# Patient Record
Sex: MALE | Race: OTHER | Employment: OTHER | URBAN - METROPOLITAN AREA
[De-identification: names, ages, dates, MRNs, and addresses within clinical notes are randomized per-mention and may not be internally consistent; named-entity substitution may affect disease eponyms.]

---

## 2019-08-13 ENCOUNTER — APPOINTMENT (OUTPATIENT)
Dept: CT IMAGING | Age: 44
End: 2019-08-13
Attending: EMERGENCY MEDICINE
Payer: SUBSIDIZED

## 2019-08-13 ENCOUNTER — HOSPITAL ENCOUNTER (EMERGENCY)
Age: 44
Discharge: HOME | End: 2019-08-13
Attending: EMERGENCY MEDICINE
Payer: SUBSIDIZED

## 2019-08-13 VITALS
DIASTOLIC BLOOD PRESSURE: 81 MMHG | SYSTOLIC BLOOD PRESSURE: 135 MMHG | HEART RATE: 60 BPM | HEIGHT: 66 IN | RESPIRATION RATE: 18 BRPM | TEMPERATURE: 98.4 F | OXYGEN SATURATION: 98 % | WEIGHT: 198 LBS | BODY MASS INDEX: 31.82 KG/M2

## 2019-08-13 DIAGNOSIS — G51.0 BELL'S PALSY: Primary | ICD-10-CM

## 2019-08-13 LAB
ALBUMIN SERPL-MCNC: 4 G/DL (ref 3.5–5)
ALBUMIN/GLOB SERPL: 1.1 {RATIO} (ref 1.1–2.2)
ALP SERPL-CCNC: 82 U/L (ref 45–117)
ALT SERPL-CCNC: 32 U/L (ref 12–78)
ANION GAP SERPL CALC-SCNC: 6 MMOL/L (ref 5–15)
AST SERPL-CCNC: 10 U/L (ref 15–37)
BASOPHILS # BLD: 0 K/UL (ref 0–0.1)
BASOPHILS NFR BLD: 0 % (ref 0–1)
BILIRUB SERPL-MCNC: 0.8 MG/DL (ref 0.2–1)
BUN SERPL-MCNC: 16 MG/DL (ref 6–20)
BUN/CREAT SERPL: 17 (ref 12–20)
CALCIUM SERPL-MCNC: 8.5 MG/DL (ref 8.5–10.1)
CHLORIDE SERPL-SCNC: 107 MMOL/L (ref 97–108)
CO2 SERPL-SCNC: 26 MMOL/L (ref 21–32)
CREAT SERPL-MCNC: 0.94 MG/DL (ref 0.7–1.3)
DIFFERENTIAL METHOD BLD: NORMAL
EOSINOPHIL # BLD: 0.1 K/UL (ref 0–0.4)
EOSINOPHIL NFR BLD: 1 % (ref 0–7)
ERYTHROCYTE [DISTWIDTH] IN BLOOD BY AUTOMATED COUNT: 13.7 % (ref 11.5–14.5)
GLOBULIN SER CALC-MCNC: 3.6 G/DL (ref 2–4)
GLUCOSE SERPL-MCNC: 115 MG/DL (ref 65–100)
HCT VFR BLD AUTO: 44.3 % (ref 36.6–50.3)
HGB BLD-MCNC: 15.6 G/DL (ref 12.1–17)
IMM GRANULOCYTES # BLD AUTO: 0 K/UL (ref 0–0.04)
IMM GRANULOCYTES NFR BLD AUTO: 0 % (ref 0–0.5)
LYMPHOCYTES # BLD: 3 K/UL (ref 0.8–3.5)
LYMPHOCYTES NFR BLD: 44 % (ref 12–49)
MCH RBC QN AUTO: 30.1 PG (ref 26–34)
MCHC RBC AUTO-ENTMCNC: 35.2 G/DL (ref 30–36.5)
MCV RBC AUTO: 85.5 FL (ref 80–99)
MONOCYTES # BLD: 0.6 K/UL (ref 0–1)
MONOCYTES NFR BLD: 8 % (ref 5–13)
NEUTS SEG # BLD: 3.1 K/UL (ref 1.8–8)
NEUTS SEG NFR BLD: 47 % (ref 32–75)
NRBC # BLD: 0 K/UL (ref 0–0.01)
NRBC BLD-RTO: 0 PER 100 WBC
PLATELET # BLD AUTO: 279 K/UL (ref 150–400)
PMV BLD AUTO: 10.2 FL (ref 8.9–12.9)
POTASSIUM SERPL-SCNC: 3.9 MMOL/L (ref 3.5–5.1)
PROT SERPL-MCNC: 7.6 G/DL (ref 6.4–8.2)
RBC # BLD AUTO: 5.18 M/UL (ref 4.1–5.7)
SODIUM SERPL-SCNC: 139 MMOL/L (ref 136–145)
WBC # BLD AUTO: 6.8 K/UL (ref 4.1–11.1)

## 2019-08-13 PROCEDURE — 80053 COMPREHEN METABOLIC PANEL: CPT

## 2019-08-13 PROCEDURE — 74011250637 HC RX REV CODE- 250/637: Performed by: EMERGENCY MEDICINE

## 2019-08-13 PROCEDURE — 85025 COMPLETE CBC W/AUTO DIFF WBC: CPT

## 2019-08-13 PROCEDURE — 70450 CT HEAD/BRAIN W/O DYE: CPT

## 2019-08-13 PROCEDURE — 99283 EMERGENCY DEPT VISIT LOW MDM: CPT

## 2019-08-13 PROCEDURE — 36415 COLL VENOUS BLD VENIPUNCTURE: CPT

## 2019-08-13 RX ORDER — ACETAMINOPHEN 500 MG
1000 TABLET ORAL ONCE
Status: COMPLETED | OUTPATIENT
Start: 2019-08-13 | End: 2019-08-13

## 2019-08-13 RX ADMIN — ACETAMINOPHEN 1000 MG: 500 TABLET ORAL at 15:04

## 2019-08-13 NOTE — DISCHARGE INSTRUCTIONS
Patient Education        Parálisis facial de Chambers: Instrucciones de cuidado - [ Bell's Palsy: Care Instructions ]  Instrucciones de cuidado    La parálisis facial de Chambers es emily parálisis o debilitamiento de los músculos de un lado de la kunal. Las personas con parálisis facial de Chambers suelen tener caído un lado de la boca y les zakiya trabajo cerrar por completo el slick de melisa mismo lado. La parálisis facial de Chambers puede interferir también con el sentido del gusto. [place] sucede cuando se inflama un nervio de la kunal. La causa de la parálisis facial de Chambers no es un ataque cerebral. No se conoce la causa de esta inflamación del nervio. Mary algunos expertos piensan que la causa podría ser un virus. Debido a esto, en ocasiones los médicos recetan un medicamento antiviral para tratarla. También podrían darle medicamentos para reducir la hinchazón. La parálisis facial de Chambers por lo general mejora por sí bianka en algunas semanas o meses. La atención de seguimiento es emily parte clave de healy tratamiento y seguridad. Asegúrese de hacer y acudir a todas las citas, y llame a healy médico si está teniendo problemas. También es emily buena idea saber los resultados de emy exámenes y mantener emily lista de los medicamentos que zainab. ¿Cómo puede cuidarse en el hogar? · [employer] medicamentos exactamente mary le fueron recetados. Llame a healy médico si yola estar teniendo problemas con healy medicamento. [employer] medicamentos específicos recetados por healy médico.  · Use lágrimas artificiales o pomada si se le secan demasiado los ojos. La parálisis facial de Chambers puede causar la caída del párpado inferior, lo que produce sequedad en el slick. · Si no puede cerrar el slick por completo, piense en usar un parche para dormir. · Ayúdese a parpadear usando un dedo para cerrar y abrir el párpado. [place] podría ayudar a mantener el slick húmedo. · Use anteojos o gafas para prevenir que el polvo y la clayton entren en el slick.   · A medida que recupera la sensación en la kunal, masajee la frente, las mejillas y los labios. El masaje podría fortalecer los músculos de la kunal. · Cepíllese los dientes y use hilo dental con frecuencia para ayudar a prevenir las caries. La parálisis facial de Chambers puede secar la saliva en un lado de healy boca. [place] aumenta el riesgo de caries. ¿Cuándo debe pedir ayuda? Llame al 911 en cualquier momento que considere que necesita atención de [place]. Por ejemplo, llame si:    · Tiene síntomas de un ataque cerebral. Estos pueden incluir:  ? Entumecimiento, hormigueo, debilidad o parálisis repentinos en la kunal, el brazo o la pierna, sobre todo si ocurre en un solo lado del cuerpo. ? Cambios súbitos en la vista. ? Problemas repentinos para hablar. ? Confusión súbita o dificultad repentina para comprender frases sencillas. ? Problemas repentinos para caminar o mantener el equilibrio. ? Un dolor de lisbeth intenso y repentino, distinto a los keyon de lisbeth anteriores.    Llame a healy médico ahora mismo o busque atención médica inmediata si:    · Siente entumecimiento o debilidad que se expande más allá de un lado de la kunal.     · Tiene un salpullido en la piel o dolor o enrojecimiento en el slick, o le molesta la steven.     · Tiene nuevo dolor de lisbeth o fawad empeora.    Preste especial atención a los cambios en healy ilda y asegúrese de comunicarse con healy médico si:    · No mejora mary se esperaba. ¿Dónde puede encontrar más información en inglés? Aundra Ben a [email or web address] Amol Magaña P168 en la búsqueda para aprender más acerca de \"Parálisis facial de Chambers: Instrucciones de cuidado - [ Bell's Palsy: Care Instructions ]. \"  Revisado: 28 marzo, 2019  Versión del contenido: 12.1  © 0328-2927 [employer], [employer]. Las instrucciones de cuidado fueron adaptadas bajo licencia por [employer] (which disclaims liability or warranty for this information).  Si usted tiene [place] emily afección médica o sobre estas instrucciones, siempre pregunte a healy profesional de ilda. [facility] niega toda garantía o responsabilidad por healy uso de esta información.

## 2019-08-13 NOTE — ED PROVIDER NOTES
38 y.o.  male with no significant past medical history, presents ambulatory to the ED accompanied by family with chief complaint of left-sided facial droop. Patient states that approximately one week ago he noticed the onset of a left facial droop, which involves the forehead. The facial droop started without any preceding injury or trauma. He was seen in the ED while on vacation in [place] on 19 for initial evaluation of the droop, and at that time he was diagnosed with \"Bell's Palsy\". Patient states that he received an injection in the face for treatment, and was also prescribed a PO course of prednisone which he has been taking as prescribed. Patient returns to the ED for further evaluation because he feels like the facial droop is not improving. He states that sometimes he is barely able to speak secondary to the droop, and his left eye is very watery because he is unable to fully close it. Also complains of decreased sensation on the left side of the face. Patient additionally complains of intermittent headaches, which have been present since before the facial droop started. Estimates that his headaches became worse when his father , but he has never seen anyone for treatment. Patient reports a history of similar facial droop in the past, approximately three years ago. He specifically denies unilateral extremity numbness or weakness, difficulty walking, or fevers. There are no other acute medical concerns at this time. PCP: Unknown, Provider    Note written by Shelby Linda. Londell Door, as dictated by Adarsh Henry MD 2:06 PM     The history is provided by the patient, a relative and medical records. A  was used (Patient requesting that 16-year old daughter serve as . ). Past Medical History:   Diagnosis Date    Chest discomfort        No past surgical history on file. No family history on file.     Social History Socioeconomic History    Marital status: SINGLE     Spouse name: Not on file    Number of children: Not on file    Years of education: Not on file    Highest education level: Not on file   Occupational History    Not on file   Social Needs    Financial resource strain: Not on file    Food insecurity:     Worry: Not on file     Inability: Not on file    Transportation needs:     Medical: Not on file     Non-medical: Not on file   Tobacco Use    Smoking status: Not on file   Substance and Sexual Activity    Alcohol use: Not on file    Drug use: Not on file    Sexual activity: Not on file   Lifestyle    Physical activity:     Days per week: Not on file     Minutes per session: Not on file    Stress: Not on file   Relationships    Social connections:     Talks on phone: Not on file     Gets together: Not on file     Attends [employer] service: Not on file     Active member of club or organization: Not on file     Attends meetings of clubs or organizations: Not on file     Relationship status: Not on file    Intimate partner violence:     Fear of current or ex partner: Not on file     Emotionally abused: Not on file     Physically abused: Not on file     Forced sexual activity: Not on file   Other Topics Concern    Not on file   Social History Narrative    Not on file         ALLERGIES: Patient has no known allergies. Review of Systems   Constitutional: Negative for activity change, chills and fever. HENT: Negative for nosebleeds, sore throat, trouble swallowing and voice change. Eyes: Positive for discharge (left eye watery). Negative for visual disturbance. Respiratory: Negative for shortness of breath. Cardiovascular: Negative for chest pain and palpitations. Gastrointestinal: Negative for abdominal pain, constipation, diarrhea and nausea. Genitourinary: Negative for difficulty urinating, dysuria, hematuria and urgency.    Musculoskeletal: Negative for back pain, gait problem, neck pain and neck stiffness. Skin: Negative for color change. Allergic/Immunologic: Negative for immunocompromised state. Neurological: Positive for facial asymmetry (left sided droop), speech difficulty, numbness (left face) and headaches. Negative for dizziness, seizures, syncope, weakness and light-headedness. Psychiatric/Behavioral: Negative for behavioral problems, confusion, hallucinations, self-injury and suicidal ideas. Vitals:    08/13/19 1414   BP: 135/81   Pulse: 60   Resp: 18   Temp: 98.4 °F (36.9 °C)   SpO2: 98%   Weight: 89.8 kg (198 lb)   Height: 5' 6\" (1.676 m)            Physical Exam   Constitutional: He appears well-developed and well-nourished. No distress. HENT:   Head: Atraumatic. Right Ear: Tympanic membrane, external ear and ear canal normal.   Left Ear: Tympanic membrane, external ear and ear canal normal.   Eyes: EOM are normal.   Neck: No tracheal deviation present. Cardiovascular:   Warm and well perfused   Pulmonary/Chest: Effort normal. No respiratory distress. Musculoskeletal: Normal range of motion. Neurological: He is alert. Coordination normal.   Left-sided facial droop with decreased sensation and left forehead involvement. Skin: Skin is warm and dry. He is not diaphoretic. Psychiatric: He has a normal mood and affect. His behavior is normal. Judgment and thought content normal.   Nursing note and vitals reviewed. Note written by Nicanor Hernandez, as dictated by Madina Pennington MD 2:06 PM     MDM     This is a 50-year-old male with past medical history, review of systems, physical exam as above, presenting with complaints of left sided facial droop, headaches. Patient states symptoms onset approximately 1 week ago, was evaluated in [address] [place] [address], diagnosed with Bell's palsy, prescribed steroids, which the patient states he has been compliant with.   The patient endorses a history of prior episodes of similar symptoms, previous head trauma. He denies fevers, chills, nausea or vomiting. Phthisical exam is remarkable for a well-appearing middle-aged male, in no acute distress, noted to be afebrile without tachycardia or hypotension. He has left-sided facial droop, with forehead involvement, decreased sensation, lid lag on the left side, without other focal neurologic findings. Suspect recurrent Bell's palsy. Given the patient's concerns, will obtain CMP, CBC, head imaging. We will reassess, and make a disposition. Procedures    3:20 PM  Unremarkable lab work and imaging, likely ongoing [address]. Patient already taking prednisone course, using eye drops, will discuss further eye protection, Neurology f/u, PCP and return precautions given.

## 2019-08-13 NOTE — ED NOTES
Patient discharged by provider. Discharge papers signed, dated and timed/e-signature filed. Papers given and questions answered. Home with family.

## 2019-08-13 NOTE — ED TRIAGE NOTES
Patient presents to ED with left facial droop which does involve the forehead, accompanied by inability to fully close left eye. Pt has prior hx of Bell's palsy, and was recently diagnosed with same in [place]. He was told to follow up when he returned home to [place], so he came here. He reports intermittent headaches. Pt is Hungarian-speaking.